# Patient Record
Sex: FEMALE | Race: WHITE | NOT HISPANIC OR LATINO | Employment: OTHER | URBAN - METROPOLITAN AREA
[De-identification: names, ages, dates, MRNs, and addresses within clinical notes are randomized per-mention and may not be internally consistent; named-entity substitution may affect disease eponyms.]

---

## 2023-01-20 ENCOUNTER — EVALUATION (OUTPATIENT)
Dept: PHYSICAL THERAPY | Facility: CLINIC | Age: 78
End: 2023-01-20

## 2023-01-20 DIAGNOSIS — R42 DIZZINESS: ICD-10-CM

## 2023-01-20 DIAGNOSIS — R42 VERTIGO: Primary | ICD-10-CM

## 2023-01-20 DIAGNOSIS — H81.11 BENIGN PAROXYSMAL POSITIONAL VERTIGO OF RIGHT EAR: ICD-10-CM

## 2023-01-20 NOTE — PROGRESS NOTES
PT Evaluation     Today's date: 2023  Patient name: Zeenat Ibrahim  : 1945  MRN: [id number]  Referring provider: Demetria Galeano MD  Dx:   Encounter Diagnosis     ICD-10-CM    1  Vertigo  R42                      Assessment  Assessment details: Assessment:  Pt is a 68 y o  female who presents to the clinic with reports of vertigo and constant dizziness with imbalance    Pt's subjective reports is consistent with BPPV and vestibular hypofunction  She  presents with Reatha Truong test positive when head is turned to the right with transient nystagmus indicative of right posterior canalithiasis  With positive head thrust indicative of vestibular hypofunction and decreased ability to maintain static balance with 2nd and 4th conditions of mCTSIB suggesting vestibular dysfunction  Patient's impairments include dizziness with looking up, down or rolling to the right in bed  These impairments are currently limiting the patient's ability to reach for items at higher levels, safely  things from the floor and avoiding activities for safety  Recently went thorough and inactive periods where she likely became deconditioned which is likely the cause of her dizziness at this time  Pt would benefit from skilled outpatient physical therapy to address the above impairments in order to improve patient's QOL,  reduce symptoms of dizziness and maximize function  Patient and son educated that symptoms of vertigo may be exacerbated over the next 24-48 hours and that this can be normal after PT evaluation and treatment  Patient advised to perform regular daily routine as able and to avoid excessive head movements and positions changes and these can illicit symptoms  Patient verbalized understanding           Impairments: activity intolerance, impaired balance, lacks appropriate home exercise program and safety issue  Other impairment: dizziness   Barriers to therapy: Lives alone  Understanding of Dx/Px/POC: good Prognosis: good    Goals  STG's:     - Patient will improve 2nd condition of MCSTIB to 15 sec or greater  demonstrating improved use of vestibular cues for balance within 4 weeks   Patient will improve 4th condition of MCSTIB to 15 sec or greater  demonstrating improved use of vestibular cues for balance within 4 weeks    - Patient is independent with initial home exercise program for improvements at home within 4 weeks  -  Gerinet will report a reduction in dizziness symptoms by 50% or greater within 4weeks    LTG's:   - Patient improves on all tasks of the MCSTIB to 30 seconds indicating improved vestibular function for balance within 8 weeks    - Patient will improve DHI by 17 points indicating improved perception of balance within 8weeks  - Patient will be able to look up and down without onset of dizziness within 8 weeks  Patient will be able to return to exercise activity with self monitoring or symptoms within 8 weeks       Plan  Patient would benefit from: skilled physical therapy  Planned therapy interventions: neuromuscular re-education, balance, canalith repositioning, patient education, therapeutic exercise, therapeutic activities, home exercise program and gait training  Frequency: 2x week  Duration in weeks: 8  Plan of Care beginning date: 1/20/2023  Plan of Care expiration date: 3/20/2023  Treatment plan discussed with: family and patient        Subjective Evaluation    Social Support  Steps to enter house: yes (B/L HR )  5  Interior stairs assessed: 6 and 6 split level   bedroomon 2nd floor, and bathrorm on 2nd floor  walkin shower with 1 grab bar  1 Tubshower downstairs  no raised toilet seat  standard bed  12  Lives in: multiple-level home  Lives with: alone    Employment status: not working (knits with friends every week  stopped around covid hasn't started back up  )  Exercise history: decondtioned, not exercising at this point  [facility] and [facility]   used to walk on the trail  Life stress:  passed away  Dizziness has limited activity tolerance, with some knee pain likely due to inactivity  having difficulty going up and down stairs due to light headedness and dizziness  has a mile arthritic condition  aspiration and cortisone injections knee feels great now  Diagnostic Tests  No diagnostic tests performed  Abnormal x-ray: only imaging of knee recently   Treatments  Previous treatment: physical therapy  Patient Goals  Patient goals for therapy: improved balance  Patient goal: Return to exercise, reduce dizzines         Objective  PT/OT Neuro Exam  Neurologic Exam  HISTORY:  HPI: Willa Chambers is a 68 y o  female referred to outpatient physical therapy for dizziness  Son Edy Sneed is present to IE today noting patient is not the most accurate historain   Onset: on and off last 5-10 years  Started off as BPPV  Posterior, son was correcting her with manuevers  This has no manifested to no more the classic pehripal presentation  More central signs  Forward flexion with trunk with ADL's emptying dishwashers, laundry, and cervical extension and flexion  Not some much with cervical rotation or side bending  Sometimes gets dizzy with rolling to right in bed  Has to sleep on left side  When shopping tries to lift to top shelf will get dizzy    Continues to drive  Feels she is find driving  Daughter noted she is driving slower than normal      Boris Graf treated her before and it was successful has gone back before  Did maneuvers  And then habituation exercises  Exercises seems to work     Avoiding activities in order to not bring on symptoms  lives by herself not as social as she used to be    Dizziness in the shower washing her hair  Has a shower chair now  Symptoms: Of balance, feel like i'm walking like I was drinking     Frequency: constant   Intensity:baseline 4-5/10, at worst 8/10    Dysequilibrium: Yes  Lightheadedness: Yes  Vertigo: Yes  Rocking or Swaying: No         Oscillopsia: No  Diplopia: No  Motion sickness: Yes  Depends who is driving   Floating, Swimming, Disconnected: No    Exacerbation Factors:  Bending over: Yes  Turning Head: No  Rolling in bed: sometimes   Walking: just feels not steady  Looking up: Yes  Supine to/from sitting: No  Optokinetic movement: No  Walking in busy environment: No     Concurrent Complaints:  Tinnitus:Yes - ringing believe it is both - not all the time   Aural Fullness:No  Known hearing loss:No  Nausea, Vomiting: No  Altered Vision: No  Needs a cataract removed  Poor Concentration: No  Memory Loss: No sometimes with word finding   Peripheral Neuropathy:No  Cervical Pain: No   Headache: Yes have always had headaches not a lot but uses OTC pain medication  Bowel bladder issues  No dyarthira and speech or swallowing       No past medical history on file  Is patient taking medication for this issue? No      PHYSICAL FINDINGS:  Cervical Spine Examination:    Resting posture:      Normal    Cervical spine active range of motion:   within normal limits, with dizziness ext and flexion       Oculomotor ROM : WNL  Resting nystagmus: No  Gaze holding nystagmus Yes   Smooth pursuit Normal    Vertical Saccades:Normal  Horizontal Saccades:Normal  Convergence: Normal        Head thrust (room light): Abnormal when head turned fast to the right     Dynamic Visual Acuity: TBa NV   Static Head: 20/  Dynamic Head: 20/    VOR x 1 (abnormal, targer blurred with dizziness  VOR cx WNL     MCTSIB  30s eyes open firm surface   16s,8",10" eyes closed form surface  30 eyes open foam surface  2s eyes closed foam surface    FGA:NV    DHI: 48  0-30 mild , 30-60 moderate,  severe disability      Positional testing: Right Left   [place] Tafoya pike (+) upward beating right rotational nystagmus lasting <30s  Onset 5sec    (-)   Roll test: (-) (-)               Michell Worthy, PT  1/20/2023       Short Term Goal Expiration Date:(4 weeks)  Long Term Goal Expiration Date: (8 weeks)  POC Expiration Date: (8 weeks )         Precautions: Fall Risk        Manuals 01/20                                       Neuro Re-Ed  Function, anatomy of vetsibular sesion role in balance and head movements                                                                  Ther Ex                                                                        Ther Activity                        Gait Training                        Modalities        CRT  r eply x 1

## 2023-01-20 NOTE — LETTER
2023    [address]  [address]  [address] [place] [address] Dr    Patient: Steff De La Torre   YOB: 1945   Date of Visit: 2023     Encounter Diagnosis     ICD-10-CM    1  Vertigo  R42       2  Dizziness  R42       3  Benign paroxysmal positional vertigo of right ear  H81 11           Dear Dr Urbano Heal: Thank you for your recent referral of Steff De La Torre  Please review the attached evaluation summary from Carli's recent visit  Please verify that you agree with the plan of care by signing the attached order  If you have any questions or concerns, please do not hesitate to call  I sincerely appreciate the opportunity to share in the care of one of your patients and hope to have another opportunity to work with you in the near future  Sincerely,    Raudel Crawford PT      Referring Provider:      I certify that I have read the below Plan of Care and certify the need for these services furnished under this plan of treatment while under my care  [address]  [address]  [address] [place] [address] Dr  Via Fax: [phone]          PT Evaluation     Today's date: 2023  Patient name: Steff De La Torre  : 1945  MRN: [id number]  Referring provider: Gracie Ling MD  Dx:   Encounter Diagnosis     ICD-10-CM    1  Vertigo  R42                      Assessment  Assessment details: Assessment:  Pt is a 68 y o  female who presents to the clinic with reports of vertigo and constant dizziness with imbalance    Pt's subjective reports is consistent with BPPV and vestibular hypofunction  She  presents with Lethia Boroughs test positive when head is turned to the right with transient nystagmus indicative of right posterior canalithiasis   With positive head thrust indicative of vestibular hypofunction and decreased ability to maintain static balance with 2nd and 4th conditions of mCTSIB suggesting vestibular dysfunction  Patient's impairments include dizziness with looking up, down or rolling to the right in bed  These impairments are currently limiting the patient's ability to reach for items at higher levels, safely  things from the floor and avoiding activities for safety  Recently went thorough and inactive periods where she likely became deconditioned which is likely the cause of her dizziness at this time  Pt would benefit from skilled outpatient physical therapy to address the above impairments in order to improve patient's QOL,  reduce symptoms of dizziness and maximize function  Patient and son educated that symptoms of vertigo may be exacerbated over the next 24-48 hours and that this can be normal after PT evaluation and treatment  Patient advised to perform regular daily routine as able and to avoid excessive head movements and positions changes and these can illicit symptoms  Patient verbalized understanding           Impairments: activity intolerance, impaired balance, lacks appropriate home exercise program and safety issue  Other impairment: dizziness   Barriers to therapy: Lives alone  Understanding of Dx/Px/POC: good   Prognosis: good    Goals  STG's:     - Patient will improve 2nd condition of MCSTIB to 15 sec or greater  demonstrating improved use of vestibular cues for balance within 4 weeks   Patient will improve 4th condition of MCSTIB to 15 sec or greater  demonstrating improved use of vestibular cues for balance within 4 weeks    - Patient is independent with initial home exercise program for improvements at home within 4 weeks  -  Patinet will report a reduction in dizziness symptoms by 50% or greater within 4weeks    LTG's:   - Patient improves on all tasks of the MCSTIB to 30 seconds indicating improved vestibular function for balance within 8 weeks    - Patient will improve DHI by 17 points indicating improved perception of balance within 8weeks  - Patient will be able to look up and down without onset of dizziness within 8 weeks  Patient will be able to return to exercise activity with self monitoring or symptoms within 8 weeks       Plan  Patient would benefit from: skilled physical therapy  Planned therapy interventions: neuromuscular re-education, balance, canalith repositioning, patient education, therapeutic exercise, therapeutic activities, home exercise program and gait training  Frequency: 2x week  Duration in weeks: 8  Plan of Care beginning date: 1/20/2023  Plan of Care expiration date: 3/20/2023  Treatment plan discussed with: family and patient        Subjective Evaluation    Social Support  Steps to enter house: yes (B/L HR )  5  Interior stairs assessed: 6 and 6 split level   bedroomon 2nd floor, and bathrorm on 2nd floor  walkin shower with 1 grab bar  1 Tubshower downstairs  no raised toilet seat  standard bed  12  Lives in: multiple-level home  Lives with: alone    Employment status: not working (knits with friends every week  stopped around covid hasn't started back up  )  Exercise history: decondtioned, not exercising at this point  [facility] and [facility]  used to walk on the trail  Life stress:  passed away  Dizziness has limited activity tolerance, with some knee pain likely due to inactivity  having difficulty going up and down stairs due to light headedness and dizziness  has a mile arthritic condition  aspiration and cortisone injections knee feels great now  Diagnostic Tests  No diagnostic tests performed  Abnormal x-ray: only imaging of knee recently   Treatments  Previous treatment: physical therapy  Patient Goals  Patient goals for therapy: improved balance  Patient goal: Return to exercise, reduce dizzines         Objective  PT/OT Neuro Exam  Neurologic Exam  HISTORY:  HPI: Ron Jamin is a 68 y o  female referred to outpatient physical therapy for dizziness   Son Erika Vee is present to IE today noting patient is not the most accurate historain   Onset: on and off last 5-10 years  Started off as BPPV  Posterior, son was correcting her with manuevers  This has no manifested to no more the classic pehripal presentation  More central signs  Forward flexion with trunk with ADL's emptying dishwashers, laundry, and cervical extension and flexion  Not some much with cervical rotation or side bending  Sometimes gets dizzy with rolling to right in bed  Has to sleep on left side  When shopping tries to lift to top shelf will get dizzy    Continues to drive  Feels she is find driving  Daughter noted she is driving slower than normal      Marian Kolb treated her before and it was successful has gone back before  Did maneuvers  And then habituation exercises  Exercises seems to work     Avoiding activities in order to not bring on symptoms  lives by herself not as social as she used to be    Dizziness in the shower washing her hair  Has a shower chair now  Symptoms: Of balance, feel like i'm walking like I was drinking  Frequency: constant   Intensity:baseline 4-5/10, at worst 8/10    Dysequilibrium: Yes  Lightheadedness: Yes  Vertigo: Yes  Rocking or Swaying: No         Oscillopsia: No  Diplopia: No  Motion sickness: Yes  Depends who is driving   Floating, Swimming, Disconnected: No    Exacerbation Factors:  Bending over: Yes  Turning Head: No  Rolling in bed: sometimes   Walking: just feels not steady  Looking up: Yes  Supine to/from sitting: No  Optokinetic movement: No  Walking in busy environment: No     Concurrent Complaints:  Tinnitus:Yes - ringing believe it is both - not all the time   Aural Fullness:No  Known hearing loss:No  Nausea, Vomiting: No  Altered Vision: No  Needs a cataract removed  Poor Concentration: No  Memory Loss: No sometimes with word finding   Peripheral Neuropathy:No  Cervical Pain: No   Headache: Yes have always had headaches not a lot but uses OTC pain medication     Bowel bladder issues  No dyarthira and speech or swallowing       No past medical history on file  Is patient taking medication for this issue? No      PHYSICAL FINDINGS:  Cervical Spine Examination:    Resting posture:      Normal    Cervical spine active range of motion:   within normal limits, with dizziness ext and flexion       Oculomotor ROM : WNL  Resting nystagmus: No  Gaze holding nystagmus Yes   Smooth pursuit Normal    Vertical Saccades:Normal  Horizontal Saccades:Normal  Convergence: Normal        Head thrust (room light): Abnormal when head turned fast to the right     Dynamic Visual Acuity: TBa NV   Static Head: 20/  Dynamic Head: 20/    VOR x 1 (abnormal, targer blurred with dizziness  VOR cx WNL     MCTSIB  30s eyes open firm surface   16s,8",10" eyes closed form surface  30 eyes open foam surface  2s eyes closed foam surface    FGA:NV    DHI: 48  0-30 mild , 30-60 moderate,  severe disability      Positional testing: Right Left   [place] Tafoya pike (+) upward beating right rotational nystagmus lasting <30s  Onset 5sec  (-)   Roll test: (-) (-)               Michell Worthy, PT  1/20/2023      Short Term Goal Expiration Date:(4 weeks)  Long Term Goal Expiration Date: (8 weeks)  POC Expiration Date: (8 weeks )        Precautions: Fall Risk        Manuals 01/20                                       Neuro Re-Ed  Function, anatomy of vetsibular sesion role in balance and head movements                                                                  Ther Ex                                                                        Ther Activity                        Gait Training                        Modalities        CRT  r eply x 1

## 2023-01-24 ENCOUNTER — OFFICE VISIT (OUTPATIENT)
Dept: PHYSICAL THERAPY | Facility: CLINIC | Age: 78
End: 2023-01-24

## 2023-01-24 DIAGNOSIS — R42 VERTIGO: Primary | ICD-10-CM

## 2023-01-24 NOTE — PROGRESS NOTES
Daily Note     Today's date: 2023  Patient name: Becky Guajardo  : 1945  MRN: [id number]  Referring provider: Sharron Lares MD  Dx:   Encounter Diagnosis     ICD-10-CM    1  Vertigo  R42                      Subjective: reports she was with headaches this weekend, slight dizziness yesterday  Today she is feeling better  Objective: See treatment diary below  Positional testing: Right Left   Dianne Tafoya pike (-) (-)   Roll test: (-) (-)     FGA   Assessment: Positional testing negative in all directions  Cconsistent cues need for proper technique  Provided patient with updated written instructions for HEP with good understanding  Assess compliance and comprehension at next session  FAG completed today patient is at risk for falls  Plan: Continue per plan of care  Short Term Goal Expiration Date:(4 weeks)  Long Term Goal Expiration Date: (8 weeks)  POC Expiration Date: (8 weeks )        Precautions: Fall Risk        Manuals                                       Neuro Re-Ed  Function, anatomy of vetsibular sesion role in balance and head movements          VOR x 1   Seated   Plain   H 30" x2  V 30"x2      Eye head coordination   Seated   Plain   H 30" x2  V 30"x2      Imaginary target   Seated   Plain   H 30" x2  V 30"x2      Static balance  airex  FAEO 30"  HT 30"  HN 30"  FAEC 30"x2      Dynamic balance   NV                      Ther Ex                                                                        Ther Activity                        Gait Training                        Modalities        CRT  r eply x 1

## 2023-01-26 ENCOUNTER — OFFICE VISIT (OUTPATIENT)
Dept: PHYSICAL THERAPY | Facility: CLINIC | Age: 78
End: 2023-01-26

## 2023-01-26 DIAGNOSIS — R42 DIZZINESS: ICD-10-CM

## 2023-01-26 DIAGNOSIS — H81.11 BENIGN PAROXYSMAL POSITIONAL VERTIGO OF RIGHT EAR: ICD-10-CM

## 2023-01-26 DIAGNOSIS — R42 VERTIGO: Primary | ICD-10-CM

## 2023-01-26 NOTE — PROGRESS NOTES
Daily Note     Today's date: 2023  Patient name: Parmjit Willams  : 1945  MRN: [id number]  Referring provider: Mario Blake MD  Dx:   Encounter Diagnosis     ICD-10-CM    1  Vertigo  R42       2  Dizziness  R42       3  Benign paroxysmal positional vertigo of right ear  H81 11                      Subjective: Emptied , notes it went okay has to do it and then stop as looking up bothers hers  Not having a good day dizziness wise today  Reports she had an increased HA yesterday  Denies vertigo, more lightheadedness and imbalance  Objective: See treatment diary below  BP position   Seated left arm 125/75  Supine post 2 min: 125/75  Seated left arm:      R dixhallpike (-)    Assessment: with negative positional testing today  With dizziness upon return to upright denies spinning  Negative for orthostatic hypotension with BP check  Tolerated treatment well  Decreased balance correction with static balance, requires cues to return to upright, increased tendency for UE support  Advised to continue all VOR activities as well as added weightshifting  verbalized understanding will follow up with compliance at NV  PT noted   Headaches seem to be increasing, may benefit form follow up with neuro HA specialist     Plan: Continue per plan of care  Short Term Goal Expiration Date:(4 weeks)  Long Term Goal Expiration Date: (8 weeks)  POC Expiration Date: (8 weeks )        Precautions: Fall Risk        Manuals                                      Neuro Re-Ed  Function, anatomy of vetsibular sesion role in balance and head movements          VOR x 1   Seated   Plain   H 30" x2  V 30"x2 Reviewed for HEP      Eye head coordination   Seated   Plain   H 30" x2  V 30"x2 Reviewed for HEP      Imaginary target   Seated   Plain   H 30" x2  V 30"x2 Seated plain   H 1 min  V  min     Static balance  airex  FAEO 30"  HT 30"  HN 30"  FAEC 30"x2 ariex   FTEO: 30"  HT 30"x2  HN 30"x2 Dynamic balance   NV Walking HT  W/dizziness  50ft x2    Walking HN w/dizziness  50ft x2    1/2 turns 50ft ea    backwars walking 10ft                      Ther Ex                                                                        Ther Activity                        Gait Training                        Modalities        CRT  r eply x 1

## 2023-01-31 ENCOUNTER — OFFICE VISIT (OUTPATIENT)
Dept: PHYSICAL THERAPY | Facility: CLINIC | Age: 78
End: 2023-01-31

## 2023-01-31 DIAGNOSIS — R42 DIZZINESS: ICD-10-CM

## 2023-01-31 DIAGNOSIS — H81.11 BENIGN PAROXYSMAL POSITIONAL VERTIGO OF RIGHT EAR: ICD-10-CM

## 2023-01-31 DIAGNOSIS — R42 VERTIGO: Primary | ICD-10-CM

## 2023-01-31 NOTE — PROGRESS NOTES
Daily Note     Today's date: 2023  Patient name: Chas Chang  : 1945  MRN: [id number]  Referring provider: Marc Redd MD  Dx:   Encounter Diagnosis     ICD-10-CM    1  Vertigo  R42       2  Dizziness  R42       3  Benign paroxysmal positional vertigo of right ear  H81 11                      Subjective: feeling much better got out for a walk yesterday without adverse affect of dizziness  Is wearing more supportive shoes, shoes that she used to exercise in  Believes her shoes are the reason why she is not as dizzy  Objective: See treatment diary below      Assessment: easily distracted ths session require redirection for proper technique  with decreased speed of head movements  Unaware of decreased motions with rotation for head turning activities as she typically avoids this due to onset of dizziness, required cues for proper completing  Challenged with static balance required verbal cues for correction of balance  Would benefit from review of weight shifting techniques at next visit  Tolerated treatment well  Patient demonstrated fatigue post treatment reviewed integration of sensory systems an focus on use of vestibular system this session to help strengthen for reduction of dizziness as well as improved stability  Patient with fair understanding  Advised for progression of oculomotor and VOR activities to standing with chair placed behind her in case of dizziness - patient verbalized understanding       Plan: Continue per plan of care  Short Term Goal Expiration Date:(4 weeks)  Long Term Goal Expiration Date: (8 weeks)  POC Expiration Date: (8 weeks )        Precautions: Fall Risk        Manuals                                     Neuro Re-Ed  Function, anatomy of vetsibular sesion role in balance and head movements          VOR x 1   Seated   Plain   H 30" x2  V 30"x2 Reviewed for HEP  Standing plain   H: 1 min   V 1 min     Eye head coordination Seated   Plain   H 30" x2  V 30"x2 Reviewed for HEP  Standing plain   H 1 min x2  V 1 min x2    Imaginary target   Seated   Plain   H 30" x2  V 30"x2 Seated plain   H 1 min  V  min Standing plain   H 1 minx2  V  1 min    Requires increased vc for technique     Static balance  airex  FAEO 30"  HT 30"  HN 30"  FAEC 30"x2 ariex   FTEO: 30"  HT 30"x2  HN 30"x2 ariex   FTEO: 30"  HT 30"x2  HN 30"x2    FAEC 30"x3    Dynamic balance   NV Walking HT  W/dizziness  50ft x2    Walking HN w/dizziness  50ft x2    1/2 turns 50ft ea    backwars walking 10ft  Walking HT/HN    50ft x4ea     1/2 turns 50ft ea x2                    Ther Ex                                                                        Ther Activity                        Gait Training                        Modalities        CRT  r eply x 1

## 2023-02-02 ENCOUNTER — OFFICE VISIT (OUTPATIENT)
Dept: PHYSICAL THERAPY | Facility: CLINIC | Age: 78
End: 2023-02-02

## 2023-02-02 DIAGNOSIS — R42 VERTIGO: Primary | ICD-10-CM

## 2023-02-02 DIAGNOSIS — R42 DIZZINESS: ICD-10-CM

## 2023-02-02 DIAGNOSIS — H81.11 BENIGN PAROXYSMAL POSITIONAL VERTIGO OF RIGHT EAR: ICD-10-CM

## 2023-02-02 NOTE — PROGRESS NOTES
Daily Note     Today's date: 2023  Patient name: Rigoberto Neal  : 1945  MRN: [id number]  Referring provider: Billye Sever, MD  Dx:   Encounter Diagnosis     ICD-10-CM    1  Vertigo  R42       2  Benign paroxysmal positional vertigo of right ear  H81 11       3  Dizziness  R42                      Subjective: reports she is feeling good today  Denies headaches  Some lightheadedness with getting out of the shower and turning to her right while lying down watching TV - didn't last long denied spinning  Completed HEP progression, went well    Objective: See treatment diary below      Assessment: demonstrated improved tolerance and performance with /VOR activities, still requires cues with eye head coordination  Discussed increasing home performance to 1 min for each activity, pt verbalized understanding  Continued education provided to patient regarding purpose of all activities and purpose for strengthening the vestibular system  Improved static balance with repeated trials  Tolerated treatment well  No exacerbation of symptoms post session today  Plan: Continue per plan of care  Short Term Goal Expiration Date:(4 weeks)  Long Term Goal Expiration Date: (8 weeks)  POC Expiration Date: (8 weeks )        Precautions: Fall Risk        Manuals                                    Neuro Re-Ed  Function, anatomy of vetsibular sesion role in balance and head movements          VOR x 1   Seated   Plain   H 30" x2  V 30"x2 Reviewed for HEP  Standing plain   H: 1 min   V 1 min  Standing plain   H: 1 min   V 1 min    Eye head coordination   Seated   Plain   H 30" x2  V 30"x2 Reviewed for HEP  Standing plain   H 1 min x2  V 1 min x2 Standing plain   H 1 min  V 1 min x2  Requires cues for proper technique   Imaginary target   Seated   Plain   H 30" x2  V 30"x2 Seated plain   H 1 min  V  min Standing plain   H 1 minx2  V  1 min    Requires increased vc for technique  Standing plain   H 1 min  V  1 min   Static balance  airex  FAEO 30"  HT 30"  HN 30"  FAEC 30"x2 ariex   FTEO: 30"  HT 30"x2  HN 30"x2  airex   FTEO30"  FTEC 10"  FAEC 30"x 2   Dynamic balance   NV Walking HT  W/dizziness  50ft x2    Walking HN w/dizziness  50ft x2    1/2 turns 50ft ea    backwards walking 10ft  Walking HT/HN    50ft x4ea  Walking HT/HN  Fwd 1 5 laps ea   Kaleb 1 lap ea     Walking half turns   50ft ea  360* turns 50ft ea                        Ther Ex                                                                        Ther Activity                        Gait Training                        Modalities        CRT  r eply x 1

## 2023-02-07 ENCOUNTER — APPOINTMENT (OUTPATIENT)
Dept: PHYSICAL THERAPY | Facility: CLINIC | Age: 78
End: 2023-02-07

## 2023-02-09 ENCOUNTER — OFFICE VISIT (OUTPATIENT)
Dept: PHYSICAL THERAPY | Facility: CLINIC | Age: 78
End: 2023-02-09

## 2023-02-09 DIAGNOSIS — R42 VERTIGO: Primary | ICD-10-CM

## 2023-02-09 DIAGNOSIS — H81.11 BENIGN PAROXYSMAL POSITIONAL VERTIGO OF RIGHT EAR: ICD-10-CM

## 2023-02-09 DIAGNOSIS — R42 DIZZINESS: ICD-10-CM

## 2023-02-09 NOTE — PROGRESS NOTES
Daily Note     Today's date: 2023  Patient name: Evelyn Masters  : 1945  MRN: [id number]  Referring provider: Cornell Harris MD  Dx:   Encounter Diagnosis     ICD-10-CM    1  Vertigo  R42       2  Benign paroxysmal positional vertigo of right ear  H81 11       3  Dizziness  R42                      Subjective: feels like her leags aren't as strong as they were, has trouble going up and down stpes  Missed last visit due to feeling of lightheadedness,  was tilting head back in shower with eyes closed and got lightheaded  Still having good days and bad days  Objective: See treatment diary below      Assessment: with onset of nausea after static balance on airex with focus on shifting weight back to midline with LOB backwards suggesting increased motion sensitivty  Demonstrated confusion with VOR activities requires cues for techniques but couldn't tolerate due to feeling of nausea  Tolerated treatment fair  Patient would benefit from continued PT  Advised to bring Instruction for HEP to next visit to ensure she is understanding them and performing them correctly  patient frequently thinks her balance problems are caused other issues (feet or eyes) educated on role of vestibular system and noted all the system coordinate together for balance  So they could play a role along with the vestibular hypofunction  Fair understanding  Plan: Continue per plan of care        Short Term Goal Expiration Date:(4 weeks)  Long Term Goal Expiration Date: (8 weeks)  POC Expiration Date: (8 weeks )        Precautions: Fall Risk        Manuals                                    Neuro Re-Ed         VOR x 1  Attempted unable to tolerate   Standing plain   H: 1 min   V 1 min  Standing plain   H: 1 min   V 1 min    Eye head coordination     Standing plain   H 1 min x2  V 1 min x2 Standing plain   H 1 min  V 1 min x2  Requires cues for proper technique   Imaginary target     Standing plain H 1 minx2  V  1 min    Requires increased vc for technique  Standing plain   H 1 min  V  1 min   Static balance airex   FTEO 30"  FTEC 30"x 3  With education    Level  FTEO  HT 30" x 2  HN 30" x2   (varying speed)       airex   FTEO30"  FTEC 10"  FAEC 30"x 2   Dynamic balance  Walking HT/HN  50ft x 4 laps ea    Walking 360* turns 50ft ea    Walking HT/HN    50ft x4ea  Walking HT/HN  Fwd 1 5 laps ea   Kaleb 1 lap ea     Walking half turns   50ft ea  360* turns 50ft ea                        Ther Ex                                                                        Ther Activity                        Gait Training                        Modalities        CRT

## 2023-02-14 ENCOUNTER — OFFICE VISIT (OUTPATIENT)
Dept: PHYSICAL THERAPY | Facility: CLINIC | Age: 78
End: 2023-02-14

## 2023-02-14 DIAGNOSIS — H81.11 BENIGN PAROXYSMAL POSITIONAL VERTIGO OF RIGHT EAR: ICD-10-CM

## 2023-02-14 DIAGNOSIS — R42 VERTIGO: Primary | ICD-10-CM

## 2023-02-14 DIAGNOSIS — R42 DIZZINESS: ICD-10-CM

## 2023-02-14 NOTE — PROGRESS NOTES
Daily Note     Today's date: 2023  Patient name: Bailee Enriquez  : 1945  MRN: [id number]  Referring provider: Bib Rogers MD  Dx:   Encounter Diagnosis     ICD-10-CM    1  Vertigo  R42       2  Dizziness  R42       3  Benign paroxysmal positional vertigo of right ear  H81 11                      Subjective: completing them everyday 3x per day has been doing more  Okay if she is sitting and doing them  But if standing they bring on more symptoms and sometimes it can last the whole day  Has to sit down  Then still feels nauseous the rest of the day  Had completed these in the past and they were fine  Objective: See treatment diary below      Assessment: very limited understanding of proper performance of HEP demonstrated with review today, with incorrect exercise performance and confusion  Demonstrated improved dynamic balance with less staggering , without dizziness - but notes she doesn't feel stable on feet   Demonstrates difficulty with self cueing for static balance reviewed weight shifting techniques in order to re-center herself, this improved with repeated practice and able with increased ankle strategy after practice  Tolerated treatment well  Patient demonstrated fatigue post treatment  Due to confusion with exercises advised patient to only perform VOR x 1 in horizontal and vertical directions - reviewed written instructions and provided her with updated copy  Assess understanding of activities at end of session, still with slight confusion, reienforced paient does not need to close eyes with this particular exercise, patient verbalized understanding  Plan: Continue per plan of care        Short Term Goal Expiration Date:(4 weeks)  Long Term Goal Expiration Date: (8 weeks)  POC Expiration Date: (8 weeks )        Precautions: Fall Risk        Manuals                                    Neuro Re-Ed         VOR x 1  Attempted unable to tolerate Standing plain   H:  1min  V 1 min  Cues for faster head movement and increased distance to ensure 30*with horizontal   Standing plain   H: 1 min   V 1 min  Standing plain   H: 1 min   V 1 min    Eye head coordination     Standing plain   H 1 min x2  V 1 min x2 Standing plain   H 1 min  V 1 min x2  Requires cues for proper technique   Imaginary target   Standing plain   H:  1min  V 1 min    Requires increased vc for techniqu  Standing plain   H 1 minx2  V  1 min    Requires increased vc for technique  Standing plain   H 1 min  V  1 min   Static balance airex   FTEO 30"  FTEC 30"x 3  With education    Level  FTEO  HT 30" x 2  HN 30" x2   (varying speed)    airex   FTEO 30"  HT 30"  HN30"  FTEC 30"x 3  With education    Weight shfiting with demo   M/L  Level   EO 20x  EC 10x  A/P  Level   EO 20x  EC 10x     airex   FTEO30"  FTEC 10"  FAEC 30"x 2   Dynamic balance  Walking HT/HN  50ft x 4 laps ea    Walking 360* turns 50ft ea  Walking HT/HN  50ft x 4 laps ea    Walking 360* turns 50ft ea   Walking HT/HN    50ft x4ea  Walking HT/HN  Fwd 1 5 laps ea   Kaleb 1 lap ea     Walking half turns   50ft ea  360* turns 50ft ea                        Ther Ex                                                                        Ther Activity                        Gait Training                        Modalities        CRT

## 2023-02-16 ENCOUNTER — OFFICE VISIT (OUTPATIENT)
Dept: PHYSICAL THERAPY | Facility: CLINIC | Age: 78
End: 2023-02-16

## 2023-02-16 DIAGNOSIS — H81.11 BENIGN PAROXYSMAL POSITIONAL VERTIGO OF RIGHT EAR: ICD-10-CM

## 2023-02-16 DIAGNOSIS — R42 DIZZINESS: ICD-10-CM

## 2023-02-16 DIAGNOSIS — R42 VERTIGO: Primary | ICD-10-CM

## 2023-02-16 NOTE — PROGRESS NOTES
Daily Note     Today's date: 2023  Patient name: Christy Ely  : 1945  MRN: [id number]  Referring provider: Yasmeen Childress MD  Dx:   Encounter Diagnosis     ICD-10-CM    1  Vertigo  R42       2  Dizziness  R42       3  Benign paroxysmal positional vertigo of right ear  H81 11                      Subjective: notes today she is dizzy, happened again while she was in the shower, but notes she didn't tilt her head back kept it straight, not sure why she is dizzy now  Mentions she does have a shower chair but does not use it  Reports compliance with HEP, although she notes she is confused as to when she is supposed to close her eyes  Objective: See treatment diary below      Assessment: reminded patient she was only to be performing VOR x 1 exercise which does not require her to close her eyes  When demonstrating performance of VOR x 1 she was with slower head movements advised for faster motion, this causes some dizziness, reassured patient this is normal and should reduce over time as she performs exercises correctly  PT continues to provide patient with education on function of vestibular and cause of dizziness  Patient continues to demonstrate fair understanding asking how the warm showers and being in the ball cause light headedness - PT suggestions possible a vascular issues, but also notes her proprioception is poor in those instances as well and that may have some affect  Maybe benefit from compensation if adaptation and habituation activities are unsuccessful  PT to continue to monitor  Advised on dizziness reduction techniques not to fight through it with dynamic balance cues needed to stop and rest minimal carryover noted from last session  Tolerated treatment well  Patient demonstrated fatigue post treatment with dizziness  Plan: Continue per plan of care   MOCA screen for cognition NV as appropriate      Short Term Goal Expiration Date:(4 weeks)  Long Term Goal Expiration Date: (8 weeks)  POC Expiration Date: (8 weeks )        Precautions: Fall Risk        Manuals 02/09 02/14 02/16 01/31 02/02                                   Neuro Re-Ed         VOR x 1  Attempted unable to tolerate  Standing plain   H: 1 min   V 1 min     Reviewed for HEP Standing plain   H: 1 min   V 1 min  Standing plain   H: 1 min   V 1 min    Eye head coordination     Standing plain   H 1 min x2  V 1 min x2 Standing plain   H 1 min  V 1 min x2  Requires cues for proper technique   Imaginary target     Standing plain   H 1 minx2  V  1 min    Requires increased vc for technique  Standing plain   H 1 min  V  1 min   Static balance airex   FTEO 30"  FTEC 30"x 3  With education    Level  FTEO  HT 30" x 2  HN 30" x2   (varying speed)     airex   FTEO 30"  HT 30"x2  HN30"x2     FTEC 30"x 3  With education    erin shifting  A/P   EO 10x  EC 10x  M/L  EO 10x  EC 10x    airex   FTEO30"  FTEC 10"  FAEC 30"x 2   Dynamic balance  Walking HT/HN  50ft x 4 laps ea    Walking 360* turns 50ft ea   Walking HT/HN  fwed 50ft x 4 laps ea    Back 50t x 2 laps  Walking HT/HN    50ft x4ea  Walking HT/HN  Fwd 1 5 laps ea   Kaleb 1 lap ea     Walking half turns   50ft ea  360* turns 50ft ea                        Ther Ex                                                                        Ther Activity                        Gait Training                        Modalities        CRT

## 2023-02-20 ENCOUNTER — OFFICE VISIT (OUTPATIENT)
Dept: PHYSICAL THERAPY | Facility: CLINIC | Age: 78
End: 2023-02-20

## 2023-02-20 DIAGNOSIS — H81.11 BENIGN PAROXYSMAL POSITIONAL VERTIGO OF RIGHT EAR: ICD-10-CM

## 2023-02-20 DIAGNOSIS — R42 DIZZINESS: ICD-10-CM

## 2023-02-20 DIAGNOSIS — R42 VERTIGO: Primary | ICD-10-CM

## 2023-02-20 NOTE — PROGRESS NOTES
Daily Note     Today's date: 2023  Patient name: Robbin Figueroa  : 1945  MRN: [id number]  Referring provider: Kelly Torres MD  Dx:   Encounter Diagnosis     ICD-10-CM    1  Vertigo  R42       2  Dizziness  R42       3  Benign paroxysmal positional vertigo of right ear  H81 11           Start Time: 1145  Stop Time: 1230  Total time in clinic (min): 45 minutes    Subjective: Having a good morning today  Hasn't been dizzy for the past few days  Calves were sore for a few days after last session from the weight shfiting and balance work       Objective: See treatment diary below      Assessment: demonstrated much improved understanding of VOR exercises with adequate performance, without any dizziness post performance  Most symptomatic after EC on uneven surface  Steps ups performed for position changes with functional tasks  Tolerated well but with c/o LE fatigue  treatment well  Patient demonstrated fatigue post treatment      Plan: Continue per plan of care        Short Term Goal Expiration Date:(4 weeks)  Long Term Goal Expiration Date: (8 weeks)  POC Expiration Date: (8 weeks )        Precautions: Fall Risk        Manuals                                     Neuro Re-Ed         VOR x 1  Attempted unable to tolerate  Standing plain   H: 1 min   V 1 min     Reviewed for HEP Standing plain   H: 1 min   V 1 min     Standing busy  H 1 min  V 1 min    Eye head coordination         Imaginary target         Static balance airex   FTEO 30"  FTEC 30"x 3  With education    Level  FTEO  HT 30" x 2  HN 30" x2   (varying speed)     airex   FTEO 30"  HT 30"x2  HN30"x2     FTEC 30"x 3  With education    erin shifting  A/P   EO 10x  EC 10x  M/L  EO 10x  EC 10x   airex  FTEO 30"  HT 30"x2  HN30"x2     Varying head speed     FTEC 10"x 2  FAEC 30"x2      Dynamic balance  Walking HT/HN  50ft x 4 laps ea    Walking 360* turns 50ft ea   Walking HT/HN  fwed 50ft x 4 laps ea    Back 50t x 2 laps walking HT/HN  Fwd 100ft ea  Back 100ft     Tandem walking  15ft     Steps ups     6inch no HHA   Fwd x 20   Lat x20 ea             Ther Ex                                                                        Ther Activity                        Gait Training                        Modalities        CRT

## 2023-02-22 ENCOUNTER — APPOINTMENT (OUTPATIENT)
Dept: PHYSICAL THERAPY | Facility: CLINIC | Age: 78
End: 2023-02-22

## 2023-02-22 DIAGNOSIS — R42 DIZZINESS: ICD-10-CM

## 2023-02-22 DIAGNOSIS — R42 VERTIGO: Primary | ICD-10-CM

## 2023-02-22 DIAGNOSIS — H81.11 BENIGN PAROXYSMAL POSITIONAL VERTIGO OF RIGHT EAR: ICD-10-CM

## 2023-02-22 NOTE — PROGRESS NOTES
Daily Note     Today's date: 2023  Patient name: Evelyn Masters  : 1945  MRN: [id number]  Referring provider: Cornell Harris MD  Dx:   Encounter Diagnosis     ICD-10-CM    1  Vertigo  R42       2  Benign paroxysmal positional vertigo of right ear  H81 11       3  Dizziness  R42                      Subjective: ***      Objective: See treatment diary below      Assessment: Tolerated treatment {Tolerated treatment :[id number]}   Patient {assessment:}      Plan: {PLAN:[id number]}     Short Term Goal Expiration Date:(4 weeks)  Long Term Goal Expiration Date: (8 weeks)  POC Expiration Date: (8 weeks )        Precautions: Fall Risk        Manuals                                     Neuro Re-Ed         VOR x 1  Attempted unable to tolerate  Standing plain   H: 1 min   V 1 min     Reviewed for HEP Standing plain   H: 1 min   V 1 min     Standing busy  H 1 min  V 1 min    Eye head coordination         Imaginary target         Static balance airex   FTEO 30"  FTEC 30"x 3  With education    Level  FTEO  HT 30" x 2  HN 30" x2   (varying speed)     airex   FTEO 30"  HT 30"x2  HN30"x2     FTEC 30"x 3  With education    erin shifting  A/P   EO 10x  EC 10x  M/L  EO 10x  EC 10x   airex  FTEO 30"  HT 30"x2  HN30"x2     Varying head speed     FTEC 10"x 2  FAEC 30"x2      Dynamic balance  Walking HT/HN  50ft x 4 laps ea    Walking 360* turns 50ft ea   Walking HT/HN  fwed 50ft x 4 laps ea    Back 50t x 2 laps  walking HT/HN  Fwd 100ft ea  Back 100ft     Tandem walking  15ft     Steps ups     6inch no HHA   Fwd x 20   Lat x20 ea             Ther Ex                                                                        Ther Activity                        Gait Training                        Modalities        CRT

## 2023-02-23 ENCOUNTER — OFFICE VISIT (OUTPATIENT)
Dept: PHYSICAL THERAPY | Facility: CLINIC | Age: 78
End: 2023-02-23

## 2023-02-23 DIAGNOSIS — R42 DIZZINESS: ICD-10-CM

## 2023-02-23 DIAGNOSIS — H81.11 BENIGN PAROXYSMAL POSITIONAL VERTIGO OF RIGHT EAR: ICD-10-CM

## 2023-02-23 DIAGNOSIS — R42 VERTIGO: Primary | ICD-10-CM

## 2023-02-23 NOTE — PROGRESS NOTES
Daily Note     Today's date: 2023  Patient name: Sheri Michael  : 1945  MRN: [id number]  Referring provider: Joseph Livingston MD  Dx:   Encounter Diagnosis     ICD-10-CM    1  Vertigo  R42       2  Dizziness  R42       3  Benign paroxysmal positional vertigo of right ear  H81 11                    Assessment: progress update performed today to assess patient progress towards short term goals  At this time patient is making good progress demonstrates increaesed use of vestibular system for balance as well as reports decreased dizziness in general  Show significant improvements in [address] [place] [address] at this time  Although challenged she has demonstrated improved understanding, performance and compliance with HEP  Positional testing completed today due to patient complaint of slight dizziness with right sidelying when getting into bed  Patient positive for right posterior canalithiasis - responded well to CRT's, will benefit from re-test next session         Impairments: activity intolerance, impaired balance, lacks appropriate home exercise program and safety issue, r BPPV   Other impairment: dizziness   Barriers to therapy: Lives alone  Understanding of Dx/Px/POC: good   Prognosis: good    PLAN: continues with POC    Goals  STG's:     - Patient will improve 2nd condition of MCSTIB to 15 sec or greater  demonstrating improved use of vestibular cues for balance within 4 weeks - MET   Patient will improve 4th condition of MCSTIB to 15 sec or greater  demonstrating improved use of vestibular cues for balance within 4 weeks - MET    - Patient is independent with initial home exercise program for improvements at home within 4 weeks - MET  -  Patinet will report a reduction in dizziness symptoms by 50% or greater within 4weeks -MET     LTG's:   - Patient improves on all tasks of the MCSTIB to 30 seconds indicating improved vestibular function for balance within 8 weeks    - Patient will improve DHI by 17 points indicating improved perception of balance within 8weeks  - Patient will be able to look up and down without onset of dizziness within 8 weeks  Patient will be able to return to exercise activity with self monitoring or symptoms within 8 weeks     Subjective:Dizziness has been off and on  Was doing good with getting into bed, but not sometimes with dizziness, however it doesn't keep going  However through  the rest of her day she has been good  reports she is at least 50% better than when she first started therapy  Continues to deny falls       Objective: See treatment diary below  dixhallpike  R (+)  R Epley performed  R re-test = (+)  R Epley performed  R re-test = (-)    L:NT    Roll test   R NT  L NT     mctsib  Level  FTEO : 30"  FTEC: 30"  airex  FTEO 30"  FTEC 5, 10", 30"    DHI:   IE: 48  PN: 28       Short Term Goal Expiration Date:(4 weeks)  Long Term Goal Expiration Date: (8 weeks)  POC Expiration Date: (8 weeks )        Precautions: Fall Risk        Manuals 02/09 02/14 02/16 2/20 2/23                                   Neuro Re-Ed         VOR x 1  Attempted unable to tolerate  Standing plain   H: 1 min   V 1 min     Reviewed for HEP Standing plain   H: 1 min   V 1 min     Standing busy  H 1 min  V 1 min    Eye head coordination         Imaginary target         Static balance airex   FTEO 30"  FTEC 30"x 3  With education    Level  FTEO  HT 30" x 2  HN 30" x2   (varying speed)     airex   FTEO 30"  HT 30"x2  HN30"x2     FTEC 30"x 3  With education    erin shifting  A/P   EO 10x  EC 10x  M/L  EO 10x  EC 10x   airex  FTEO 30"  HT 30"x2  HN30"x2     Varying head speed     FTEC 10"x 2  FAEC 30"x2      Dynamic balance  Walking HT/HN  50ft x 4 laps ea    Walking 360* turns 50ft ea   Walking HT/HN  fwed 50ft x 4 laps ea    Back 50t x 2 laps  walking HT/HN  Fwd 100ft ea  Back 100ft     Tandem walking  15ft     Steps ups     6inch no HHA   Fwd x 20   Lat x20 ea             Ther Ex Ther Activity                        Gait Training                        Modalities        CRT

## 2023-02-27 ENCOUNTER — OFFICE VISIT (OUTPATIENT)
Dept: PHYSICAL THERAPY | Facility: CLINIC | Age: 78
End: 2023-02-27

## 2023-02-27 DIAGNOSIS — R42 VERTIGO: Primary | ICD-10-CM

## 2023-02-27 DIAGNOSIS — H81.11 BENIGN PAROXYSMAL POSITIONAL VERTIGO OF RIGHT EAR: ICD-10-CM

## 2023-02-27 DIAGNOSIS — R42 DIZZINESS: ICD-10-CM

## 2023-02-27 NOTE — PROGRESS NOTES
Daily Note    Today's date: 2023  Patient name: King Jitendra  : 1945  MRN: [id number]  Referring provider: Johanna Patel MD  Dx:   Encounter Diagnosis     ICD-10-CM    1  Vertigo  R42       2  Dizziness  R42       3  Benign paroxysmal positional vertigo of right ear  H81 11           Start Time: 1030  Stop Time: 1100  Total time in clinic (min): 30 minutes    Subjective: patient reports today is the best she has felt since coming to therapy  Objective: See treatment diary below  Positional testing:  Dix[place]pike (-) B/L  Roll test (-) B/L    FGA: (< fall risk)    Assessment: positional testing negative at this time throughout all canals  Last session patient demonstrated significant improvements in [facility] and improved with FOTO survey today  She demonstrated good understanding of HEP and was advised to increased to 1 min with each exercise  Demonstrates slight confusion with FGA testing requiring repeat of instructions and demonstrations  FGA improved and patient is not at increased risk for falls at this time  Patient will be placed on a 30 day hold - if patient feels he/she needs to return to therapy or would like to review HEP within the next 30 days they are to contact the clinic to schedule an appointment  If PT clinic does not hear from patient within 30 days, patient is aware and agreeable to PT discharge       Plan: 30 day hold      Short Term Goal Expiration Date:(4 weeks)  Long Term Goal Expiration Date: (8 weeks)  POC Expiration Date: (8 weeks )        Precautions: Fall Risk        Manuals                                    Neuro Re-Ed         VOR x 1  Seated busy  H 1 min  V 1 min   Standing plain   H: 1 min   V 1 min     Reviewed for HEP Standing plain   H: 1 min   V 1 min     Standing busy  H 1 min  V 1 min    Eye head coordination         Imaginary target         Static balance   airex   FTEO 30"  HT 30"x2  HN30"x2     FTEC 30"x 3  With education    erin shifting  A/P   EO 10x  EC 10x  M/L  EO 10x  EC 10x   airex  FTEO 30"  HT 30"x2  HN30"x2     Varying head speed     FTEC 10"x 2  FAEC 30"x2      Dynamic balance    Walking HT/HN  fwed 50ft x 4 laps ea    Back 50t x 2 laps  walking HT/HN  Fwd 100ft ea  Back 100ft     Tandem walking  15ft     Steps ups     6inch no HHA   Fwd x 20   Lat x20 ea             Ther Ex                                                                        Ther Activity                        Gait Training                        Modalities        CRT